# Patient Record
Sex: MALE | Race: WHITE | NOT HISPANIC OR LATINO | Employment: FULL TIME | URBAN - METROPOLITAN AREA
[De-identification: names, ages, dates, MRNs, and addresses within clinical notes are randomized per-mention and may not be internally consistent; named-entity substitution may affect disease eponyms.]

---

## 2025-06-16 ENCOUNTER — TELEPHONE (OUTPATIENT)
Dept: PRIMARY CARE | Facility: CLINIC | Age: 40
End: 2025-06-16
Payer: COMMERCIAL

## 2025-06-16 NOTE — TELEPHONE ENCOUNTER
Patient was last seen in 2021.   He has not been into office for a visit since 2021.   He was wanting to schedule a yearly physical.     Please advise, Thanks!

## 2025-07-25 ASSESSMENT — PROMIS GLOBAL HEALTH SCALE
RATE_PHYSICAL_HEALTH: GOOD
RATE_AVERAGE_PAIN: 2
CARRYOUT_SOCIAL_ACTIVITIES: EXCELLENT
RATE_MENTAL_HEALTH: EXCELLENT
RATE_QUALITY_OF_LIFE: VERY GOOD
EMOTIONAL_PROBLEMS: RARELY
RATE_SOCIAL_SATISFACTION: VERY GOOD
RATE_GENERAL_HEALTH: VERY GOOD
CARRYOUT_PHYSICAL_ACTIVITIES: COMPLETELY

## 2025-07-28 ENCOUNTER — APPOINTMENT (OUTPATIENT)
Dept: PRIMARY CARE | Facility: CLINIC | Age: 40
End: 2025-07-28
Payer: COMMERCIAL

## 2025-07-28 VITALS
BODY MASS INDEX: 29.95 KG/M2 | WEIGHT: 227 LBS | HEART RATE: 83 BPM | DIASTOLIC BLOOD PRESSURE: 82 MMHG | OXYGEN SATURATION: 98 % | SYSTOLIC BLOOD PRESSURE: 124 MMHG

## 2025-07-28 DIAGNOSIS — Z00.00 ENCOUNTER FOR WELL ADULT EXAM WITHOUT ABNORMAL FINDINGS: Primary | ICD-10-CM

## 2025-07-28 DIAGNOSIS — M25.562 LEFT KNEE PAIN, UNSPECIFIED CHRONICITY: ICD-10-CM

## 2025-07-28 DIAGNOSIS — R10.32 LEFT LOWER QUADRANT ABDOMINAL PAIN: ICD-10-CM

## 2025-07-28 PROCEDURE — 99385 PREV VISIT NEW AGE 18-39: CPT | Performed by: FAMILY MEDICINE

## 2025-07-28 PROCEDURE — 1036F TOBACCO NON-USER: CPT | Performed by: FAMILY MEDICINE

## 2025-07-28 SDOH — HEALTH STABILITY: PHYSICAL HEALTH: ON AVERAGE, HOW MANY MINUTES DO YOU ENGAGE IN EXERCISE AT THIS LEVEL?: 30 MIN

## 2025-07-28 SDOH — HEALTH STABILITY: PHYSICAL HEALTH: ON AVERAGE, HOW MANY DAYS PER WEEK DO YOU ENGAGE IN MODERATE TO STRENUOUS EXERCISE (LIKE A BRISK WALK)?: 5 DAYS

## 2025-07-28 ASSESSMENT — PATIENT HEALTH QUESTIONNAIRE - PHQ9
2. FEELING DOWN, DEPRESSED OR HOPELESS: NOT AT ALL
SUM OF ALL RESPONSES TO PHQ9 QUESTIONS 1 & 2: 0
1. LITTLE INTEREST OR PLEASURE IN DOING THINGS: NOT AT ALL

## 2025-07-28 ASSESSMENT — ENCOUNTER SYMPTOMS
ABDOMINAL PAIN: 1
FEVER: 0
COUGH: 0

## 2025-07-28 NOTE — PROGRESS NOTES
Subjective   Patient ID: Beck Lujan is a 40 y.o. male who presents for Annual Exam (PHQ2 - negative ), Abdominal Pain, and Knee Pain.    Abdominal Pain  This is a new problem. The current episode started 1 to 4 weeks ago. The pain is located in the LLQ. The pain is at a severity of 2/10. The quality of the pain is aching. Pertinent negatives include no fever.   Knee Pain   The pain is present in the left knee. The pain is at a severity of 3/10.        Here today for annual physical  He has had left lower quadrant abdominal discomfort over the past few weeks.  This typically occurs when he is hunching forward.  He describes this as a mild discomfort.  This is overall unchanged  He was in [place] 1 month ago and did have 1 week of diarrhea afterwards.  The diarrhea has gotten better.  He is now having a bowel movement every 1 to 2 days.  He has been taking fiber  He has had left knee pain.  This started 1 month ago after he had been sitting on a plane for a few hours.  He typically only noticed this when he is squatting.  This is located anterior aspect of the knee.  No history of any knee problems in the past  He works as a teacher  He does not smoke  Family history is significant for type 1 diabetes in his sister.  No family history of any early heart disease or early cancer  Last Tdap vaccine was 2021    Patient Health Questionnaire-2 Score: 0 (7/28/2025 11:11 AM)        Review of Systems   Constitutional:  Negative for fever.   Respiratory:  Negative for cough.    Cardiovascular:  Negative for chest pain.   Gastrointestinal:  Positive for abdominal pain.   All other systems reviewed and are negative.      Objective   /82   Pulse 83   Wt 103 kg (227 lb)   SpO2 98%   BMI 29.95 kg/m²     Physical Exam  Vitals reviewed.   Constitutional:       General: He is not in acute distress.     Appearance: Normal appearance. He is well-developed.   HENT:      Head: Normocephalic.      Right Ear: Tympanic membrane,  ear canal and external ear normal.      Left Ear: Tympanic membrane, ear canal and external ear normal.      Nose: Nose normal.      Mouth/Throat:      Mouth: Mucous membranes are moist.     Eyes:      Conjunctiva/sclera: Conjunctivae normal.     Neck:      Thyroid: No thyromegaly.      Vascular: No JVD.     Cardiovascular:      Rate and Rhythm: Normal rate and regular rhythm.      Heart sounds: Normal heart sounds.   Pulmonary:      Effort: Pulmonary effort is normal.      Breath sounds: Normal breath sounds.   Abdominal:      Palpations: Abdomen is soft.      Tenderness: There is no abdominal tenderness.     Musculoskeletal:         General: No tenderness. Normal range of motion.      Right lower leg: No edema.      Left lower leg: No edema.      Comments: Left knee exam: There is no tenderness to palpation.  No swelling.  No pain with patellar movement   Lymphadenopathy:      Cervical: No cervical adenopathy.     Skin:     Findings: No rash.     Neurological:      Mental Status: He is alert and oriented to person, place, and time.     Psychiatric:         Mood and Affect: Mood normal.         Behavior: Behavior normal.         Assessment/Plan   Assessment & Plan  Encounter for well adult exam without abnormal findings    Orders:    CBC; Future    Comprehensive Metabolic Panel; Future    Lipid Panel; Future    Left knee pain, unspecified chronicity    Orders:    CBC; Future    Comprehensive Metabolic Panel; Future    Lipid Panel; Future    Referral to Physical Therapy; Future    XR knee left 4+ views; Future    Left lower quadrant abdominal pain    Orders:    CBC; Future    Comprehensive Metabolic Panel; Future    Lipid Panel; Future    US abdomen complete; Future    Healthy diet and regular exercise recommended  Check screening labs including glucose and lipid panel  Up-to-date on tetanus vaccine  Abdominal discomfort: He has had a several week history of a mild discomfort involving the left lower quadrant of his  abdomen, which is typically only noticeable when he is hunching forward.  He did have diarrhea at onset which has since resolved.  This started close to the same time that he had started taking fiber, and fiber may be contributing to his abdominal discomfort.  I did recommend that he hold the fiber for now to see if symptoms improve  If the symptoms do not improve, recommended abdominal ultrasound to further evaluate.  I will order this today, and it would be okay for him to cancel if the pain resolves completely within a week of holding fiber  Recommended that he contact me within 3 to 4 weeks if abdominal pain has not completely resolved  Left knee pain: We will order an x-ray and refer for PT.  Follow-up in the next 2 months if pain does not resolve with PT  Follow-up in 1 year for next CPE

## 2025-07-28 NOTE — PROGRESS NOTES
Physical Therapy  Physical Therapy Evaluation    Patient Name: Beck Lujan  MRN: [id number]  Today's Date: 7/28/2025                            Insurance:    Visit number: 1 of 9  Authorization info: No Auth Req  Insurance Type: [employer]    General:  Reason for visit: ***  Referred by: Edmundo Walsh DO     Current Problem:  M25.562ICD-10-CMLeft knee pain, unspecified chronicity      Precautions: ***         Medical History Form: Reviewed (scanned into chart)    Subjective:   He has had left knee pain.  This started 1 month ago after he had been sitting on a plane for a few hours.  He typically only noticed this when he is squatting.  This is located anterior aspect of the knee.  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    Chief Complaint: Patient presents to clinic ***  Onset Date: ***  NOEL: {NOEL:72530}    Current Condition:   {Current Condition:39314}    Pain:     Location: ***  Description: ***  Aggravating Factors:  {Aggravating Factors:62529}  Relieving Factors:  {Relieving Factors:91024}    Relevant Information (PMH & Previous Tests/Imaging): NA  Previous Interventions/Treatments: {Previous Interventions/Treatments:68501}    Prior Level of Function (PLOF)  Patient previously independent with all ADLs  Exercise/Physical Activity: ***  Work/School: ***    Patients Living Environment: Reviewed and no concern    Primary Language: ***     Patient's Goal(s) for Therapy: ***    Red Flags: Do you have any of the following? {Yes/No:14788}  Fever/chills, unexplained weight changes, dizziness/fainting, unexplained change in bowel or bladder functions, unexplained malaise or muscle weakness, night pain/sweats, numbness or tingling    Objective:    Knee ROM (degrees) -  L knee Extension: *** AROM   L knee Flexion: *** AROM       Knee MMT (/5) -   L knee Extension: ***   L knee Flexion: ***     Hip MMT (/5) -   L hip Flexion: ***  L hip Adduction: ***  L hip Abduction:  ***    Integumentary -   ***    Palpation -  TTP ***  Tightness Noted of ***    Special Tests -  Lachman {POSITIVE/NEGATIVE:19171} {RIGHT/LEFT:83675}  Anterior Drawer {POSITIVE/NEGATIVE:23171} {RIGHT/LEFT:24811}  Posterior Drawer {POSITIVE/NEGATIVE:87252} {RIGHT/LEFT:25366}  Valgus/Varus {POSITIVE/NEGATIVE:61667} {RIGHT/LEFT:60963}      Gait -  Pt is ambulating {WITH/WITHOUT:39576} an assistive device (***). Pattern: *** 3-point step *** pattern.   Noted: *** antalgic, decreased heel strike, decreased toe push off, Trendelenburg, and trunk lean.   *** ataxia, circumduction, foot slap, hip hike, vaulting, and toe drag.    Posture -   Lower Extremity Functional Movements  Transfers: ***  Gait: ***      Outcome Measures:  {PT Outcome Measures:66663}     Complexity ***    EDUCATION: Pt educated in HEP, PT POC, anatomy, activity avoidance, proper positioning/posture, use of *** , pt demonstrates understanding of PT info, all questions answered.    HEP:    Goals: Set and discussed today  Increase ROM to WFL of L knee  Increase Strength where deficits noted by 1/3 to 1 mm grade of L knee  Ind w/ HEP to expedite progress and promote goal achievement of L knee  Improve Outcome score to  evidence improved function of L knee  Return to PLOF of L knee  Decrease pain to 2/10 or less of L knee  Pt's functional goals      Plan of care was developed with input and agreement by the patient.    Treatment Performed:    Therapeutic Exercise:    *** min  ***    Manual Therapy:    *** min  ***      Assessment: 39 y/o M presents with c/o L knee pain. Upon examination patient demonstrates *** limiting overall functional mobility including ***. Activity limitations and participations restrictions include ***. Pt to benefit from outpatient PT to address deficits, maximize functional mobility and improve QOL.  The clinical presentation of this patient is stable and their history and examination findings are consistent with a low complexity  evaluation with good rehab potential.            Plan:     Planned Interventions include: therapeutic exercise, self-care home management, manual therapy, therapeutic activities, gait training, neuromuscular coordination, vasopneumatic, dry needling, aquatic therapy and modalities PRN.  Frequency: 2x/wk  Duration: 4weeks

## 2025-07-29 LAB
ALBUMIN SERPL-MCNC: 4.8 G/DL (ref 3.6–5.1)
ALP SERPL-CCNC: 69 U/L (ref 36–130)
ALT SERPL-CCNC: 19 U/L (ref 9–46)
ANION GAP SERPL CALCULATED.4IONS-SCNC: 6 MMOL/L (CALC) (ref 7–17)
AST SERPL-CCNC: 21 U/L (ref 10–40)
BILIRUB SERPL-MCNC: 1.1 MG/DL (ref 0.2–1.2)
BUN SERPL-MCNC: 13 MG/DL (ref 7–25)
CALCIUM SERPL-MCNC: 9.7 MG/DL (ref 8.6–10.3)
CHLORIDE SERPL-SCNC: 107 MMOL/L (ref 98–110)
CHOLEST SERPL-MCNC: 162 MG/DL
CHOLEST/HDLC SERPL: 4.2 (CALC)
CO2 SERPL-SCNC: 27 MMOL/L (ref 20–32)
CREAT SERPL-MCNC: 1.04 MG/DL (ref 0.6–1.26)
EGFRCR SERPLBLD CKD-EPI 2021: 94 ML/MIN/1.73M2
ERYTHROCYTE [DISTWIDTH] IN BLOOD BY AUTOMATED COUNT: 12.5 % (ref 11–15)
GLUCOSE SERPL-MCNC: 88 MG/DL (ref 65–99)
HCT VFR BLD AUTO: 46.2 % (ref 38.5–50)
HDLC SERPL-MCNC: 39 MG/DL
HGB BLD-MCNC: 15.7 G/DL (ref 13.2–17.1)
LDLC SERPL CALC-MCNC: 105 MG/DL (CALC)
MCH RBC QN AUTO: 31.8 PG (ref 27–33)
MCHC RBC AUTO-ENTMCNC: 34 G/DL (ref 32–36)
MCV RBC AUTO: 93.5 FL (ref 80–100)
NONHDLC SERPL-MCNC: 123 MG/DL (CALC)
PLATELET # BLD AUTO: 225 THOUSAND/UL (ref 140–400)
PMV BLD REES-ECKER: 10.5 FL (ref 7.5–12.5)
POTASSIUM SERPL-SCNC: 4.6 MMOL/L (ref 3.5–5.3)
PROT SERPL-MCNC: 7.1 G/DL (ref 6.1–8.1)
RBC # BLD AUTO: 4.94 MILLION/UL (ref 4.2–5.8)
SODIUM SERPL-SCNC: 140 MMOL/L (ref 135–146)
TRIGL SERPL-MCNC: 90 MG/DL
WBC # BLD AUTO: 5.1 THOUSAND/UL (ref 3.8–10.8)

## 2025-07-30 ENCOUNTER — EVALUATION (OUTPATIENT)
Dept: PHYSICAL THERAPY | Facility: CLINIC | Age: 40
End: 2025-07-30
Payer: COMMERCIAL

## 2025-07-30 DIAGNOSIS — M25.562 LEFT ANTERIOR KNEE PAIN: Primary | ICD-10-CM

## 2025-07-30 DIAGNOSIS — M25.562 ACUTE PAIN OF LEFT KNEE: ICD-10-CM

## 2025-07-30 PROCEDURE — 97161 PT EVAL LOW COMPLEX 20 MIN: CPT | Mod: GP

## 2025-07-30 PROCEDURE — 97110 THERAPEUTIC EXERCISES: CPT | Mod: GP

## 2025-07-30 ASSESSMENT — PATIENT HEALTH QUESTIONNAIRE - PHQ9
1. LITTLE INTEREST OR PLEASURE IN DOING THINGS: NOT AT ALL
SUM OF ALL RESPONSES TO PHQ9 QUESTIONS 1 AND 2: 0
2. FEELING DOWN, DEPRESSED OR HOPELESS: NOT AT ALL

## 2025-07-30 ASSESSMENT — ENCOUNTER SYMPTOMS
DEPRESSION: 0
LOSS OF SENSATION IN FEET: 0
OCCASIONAL FEELINGS OF UNSTEADINESS: 0

## 2025-07-30 NOTE — PROGRESS NOTES
Patient Name: Beck Lujan  MRN: [id number]  Time Calculation  Start Time: 1555  Stop Time: 1640  Time Calculation (min): 45 min  PT Evaluation Time Entry  PT Evaluation (Low) Time Entry: 30  PT Therapeutic Procedures Time Entry  Therapeutic Exercise Time Entry: 10     Non-Billable Time  Non-billable time: 5  Non-billable time reason: Dry needling    Current Problem  1. Left anterior knee pain  Follow Up In Physical Therapy      2. Acute pain of left knee  Follow Up In Physical Therapy        Insurance  Visit #1  ANTHEM BMN PT OT COPAY 0 (0)   COVERAGE 80 OOP 9450(0) NO AUTH REQ         Subjective     General: Pt is a 40 y.o. male presenting to clinic with c/o L anterior knee pain and pressure present for about one month. Notes when  in crouched position he states noticing deep ache in the front of the knee since he traveling in plane for 4 hours and states he was really cramped up. Denies any numbness or tingling. Denies any buckling, catching. States occasional pop when extending leg after sitting bent for a while. 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 He states previous LisFranc injury on R foot (10 years ago) and notices has been compensating recently more than normal especially since recent trip to [place] about 1 month ago. Currently having difficulty with deep bending, kneeling, squatting. 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        Pain:  Current:  0/10  High: 5/10  Low: 0/10  Av/10     Aggravating Factors: Crouching, deep bending, squatting  Alleviating Factors: Rest, extending leg    Reviewed medical screening form with pt and medical screening assessed      Objective   Knee Musculoskeletal Exam  Gait    Gait is normal.    Inspection    Right      Right knee inspection is normal.       Left      Left knee inspection is normal.     Palpation    Right      Right knee palpation is unremarkable.        Tenderness: none      Left      Tenderness: present          Patellar tendon: mild          Quadriceps tendon: mild      Range of Motion    Right      Right knee range of motion is normal and full.        Active extension: -5      Active flexion: 130    Left      Left knee range of motion is normal and full.        Active extension: -5      Active flexion: 130    Strength    Strength additional comments: See below     Instability    Right      Instability signs: none - stable    Left      Instability signs: none - stable    Neurovascular    Right      Right knee neurovascular exam is normal.      Left      Left knee neurovascular exam is normal.      Special Signs    Left      J sign: mild        Patellar apprehension: mild      Hip Musculoskeletal Exam  Gait    Gait is normal.    Inspection    Right        Right hip inspection is normal.    Left        Left hip inspection is normal.    Palpation    Right        Right hip palpation is normal.      Tenderness: none    Left        Left hip palpation is normal.      Tenderness: none    Range of Motion    Right      Right hip range of motion is within functional limits.     Left      Left hip range of motion is within functional limits.     Strength    Strength additional comments: See below    Neurovascular    Right        Right hip neurovascular exam is normal.    Left        Left hip neurovascular exam is normal.    Special Tests    Right      JOHN test (right): negative      Impingement test: negative    Left      JOHN test (left): negative      Impingement test: negative       (+) = pain indicated during testing    R/L Hip Flex: 36#/37#  R/L Hip Ext: 46#/49#  R/L Hip Abd: 35/34#  R/L Knee Ext: 57#/60# (+)  R/L Knee Flex: 65#/60#    FADDIR R/L: -/-  JOHN R/L: -/-    SL squat: Increase knee valgus and discomfort on L compared to R LE  SL 3 hop  test: WNL tennille  Step-down: Increase knee valgus and discomfort on L compared to R LE    Outcome Measures:  LEFS:  68/80     Treatment: See HEP below  Dynamic HS stretch x12 5 sec  [place] squat with black TB x10 15 sec hold  3-way hip vs GTB x10 ea way  Heel elevated squat with GTB around knees x12 5 sec hold    Intramuscular Dry Needling:  Intramuscular dry needling  Patient does not present with any of the following precautions/contraindications for dry needling treatment: active infection, presence of open wound, personal/family history of blood clotting disorder, active malignancy, extreme aversion to needles or previous/recent surgery in the target area. Patient provided informed consent for treatment. Patient tolerated treatment well and did not report adverse reactions. No adverse reactions were observed.      R knee Needle Placement:  Needle 1 75 mm needle, Location:Medial L quadriceps, Depth: 75-90%, Insertion angle: Perpendicular, Needle manipulation performed, pistoning  Needle 2 75 mm needle, Location: Lateral L quadriceps, Depth: 75-90%, Insertion angle: Perpendicular, Needle manipulation performed, pistoning  Needle 3 75 mm needle, Location: L genu articularis, Depth: 75-90%, Insertion angle: Perpendicular, Needle manipulation performed, pistoning  Needle 4- 30 mm needle, Location: Medial L patellar tendon, Depth: 50-75%, Insertion angle: Oblique, Needle manipulation performed, pistoning  Needle 5- 30 mm needle, Location: Lateral L patellar tendon, Depth: 50-75%, Insertion angle: Oblique, Needle manipulation performed, pistoning    Area prepped and cleaned with isopropyl alcohol pad in sterile fashion. Therapist wore appropriate PPE. Removed without adverse reaction. Patient educated regarding potential post-needling soreness and self-management strategies           EDUCATION/HEP:  Pt was educated on the anatomy and patellar tracking of knee along with building patellar tendon loading capacity. Pt was  educated on patellar tendinopathy and bursitis related signs along with importance in patellar and knee stability via quad and lateral gluteal strengthening. Pt was educated on benefit of dry needling in addition to exercises and provided informed consent to treatment this date. Pt was educated on common adverse effects and symptoms post dry needling. He was encouraged to drink water and hydrate as well as take a warm shower to minimize normal symptom of muscular soreness. He was instructed that muscular ache and soreness is a normal symptom after dry needling and was encouraged to perform all above activities to minimize symptoms. Pt denied any sharp/shooting or adverse effects during and after treatment of dry needling. Pt was educated on POC, expected timeline and interventions. Pt acknowledged good understanding and was in agreement to all above information.        Assessment:   Pt is a 40 y.o. male coming to clinic with primary complaint of L anterior knee pain and pressure . On exam is demonstrating  s/s  with patellar tendinitis, bursitis and decreased tolerance to patellar tendon loading. He demonstrates decreased tolerance to deep squatting and bending . These deficits have lead to functional impairments with deep squatting and crouching and occasionally descending stairs . Recommend skilled PT to address the aforementioned deficits and allow pt to restore PLOF and maximize functional capacity. Anticipate good prognosis given age, CLOF, activity level, acute nature of condition , attitude towards therapy, and support system. Patient would benefit from PT services to address current impairments and facilitate improvement in current activity limits. Educated patient on current POC, initial HEP and current examination findings. Patient verbalized understanding of all education and instruction provided today.    Patient tolerated treatment well today and demonstrated/noted less pressure in anterior  knee with squatting after DN performed this date. He demonstrated no adverse effects during or after treatment. Will continue to benefit from skilled intervention to address the above mentioned impairments and limitations. No adverse reactions were observed or reported from patient at the conclusion of today's session.       Clinical Presentation: Stable    Level of Complexity: Low     Goals:    Patient will improve L knee strength >/= 90% of limb symmetry index R LE strength testing with hand dynamometer along with pain-free during testing.    Patient will demonstrate full-range pain free bodyweight squat 10 times to demonstrate improved tolerance to deep knee bending/squatting.    Patient will improve SL step down and squat to WNL of R LE as demonstrated with no pain or significant knee valgus compensation to indicated improved knee stability.    Patient will be independent with HEP.    Pt will improve LEFS score by at least 9 points (MCID) to indicate significant improvement in functional abilities.       Plan  1x/week for 2 visits     Planned interventions include: blood flow restriction, aquatic therapy, biofeedback, cryotherapy, dry needling, edema control, education/instruction, electrical stimulation, gait training, home program, hot pack, kinesiotaping, manual therapy, neuromuscular re-education, self care/home management, therapeutic activities, therapeutic exercises, ultrasound and vasopneumatic device w/ cold.

## 2025-08-06 ENCOUNTER — TREATMENT (OUTPATIENT)
Dept: PHYSICAL THERAPY | Facility: CLINIC | Age: 40
End: 2025-08-06
Payer: COMMERCIAL

## 2025-08-06 DIAGNOSIS — M25.562 ACUTE PAIN OF LEFT KNEE: ICD-10-CM

## 2025-08-06 DIAGNOSIS — M25.562 LEFT ANTERIOR KNEE PAIN: ICD-10-CM

## 2025-08-06 PROCEDURE — 97032 APPL MODALITY 1+ESTIM EA 15: CPT | Mod: GP

## 2025-08-06 PROCEDURE — 97110 THERAPEUTIC EXERCISES: CPT | Mod: GP

## 2025-08-06 NOTE — PROGRESS NOTES
Patient Name: Beck Lujan  MRN: [id number]  Time Calculation  Start Time: 1642  Stop Time: 1727  Time Calculation (min): 45 min     PT Therapeutic Procedures Time Entry  Therapeutic Exercise Time Entry: 35  PT Modalities Time Entry  E-Stim (Attended) Time Entry: 10       Current Problem  1. Left anterior knee pain  Follow Up In Physical Therapy      2. Acute pain of left knee  Follow Up In Physical Therapy        Insurance  Visit #2  ANTHEM BMN PT OT COPAY 0 (0)   COVERAGE 80 OOP 9450(0) NO AUTH REQ         Subjective     General:   Pt notes doing well since last visit but states not progressing depth yet. He states German squats seem to help him get increased depth. He notes heel elevated squats are still the most difficult. He states still somewhat difficult achieving lower depth but slightly improved since last visit. Notes no issues with trialed walk/jog since last session. States some increase tightness s/t to trip to [place] but no increase in pain only difficulty/mild discomfort with low depth in squat. No other new concerns mentioned.      Pain:  Current:  0/10        Objective     Treatment:   Incline treadmill walking x 5 min @ 2.2 mph increasing 1 deg incline every minute    Glute bridge with abduction vs GTB 2x12  Sumo squat vs GTB x12   Monster walk/side stepping vs GTB x 5 laps ea  8 in power step-up F/L 2x10 ea way holding 5# weight  6 in F/L heel tap 2x10 ea   8 in F/L heel tap x10 ea (added to HEP)  Iso abd vs GTB plus reverse lunge on slide 2x10 (added to HEP)  Northern Irish squat with black TB x10 15 sec hold      Intramuscular Dry Needling:  Intramuscular dry needling  Patient does not present with any of the following precautions/contraindications for dry needling treatment: active infection, presence of open wound, personal/family history of blood clotting disorder, active malignancy, extreme aversion to needles or previous/recent surgery in the target area. Patient provided informed  consent for treatment. Patient tolerated treatment well and did not report adverse reactions. No adverse reactions were observed.      L knee Needle Placement:  Needle 1 75 mm needle, Location:Medial L quadriceps, Depth: 75-90%, Insertion angle: Perpendicular, Needle manipulation performed, pistoning  Needle 2 75 mm needle, Location: Lateral L quadriceps, Depth: 75-90%, Insertion angle: Perpendicular, Needle manipulation performed, pistoning  Needle 3 75 mm needle, Location: L genu articularis, Depth: 75-90%, Insertion angle: Perpendicular, Needle manipulation performed, pistoning  Needle 4- 30 mm needle, Location: Medial L patellar tendon, Depth: 50-75%, Insertion angle: Oblique, Needle manipulation performed, pistoning  Needle 5- 30 mm needle, Location: Lateral L patellar tendon, Depth: 50-75%, Insertion angle: Oblique, Needle manipulation performed, pistoning    Area prepped and cleaned with isopropyl alcohol pad in sterile fashion. Therapist wore appropriate PPE. Removed without adverse reaction. Patient educated regarding potential post-needling soreness and self-management strategies       Intramuscular dry needling  Patient does not present with any of the following precautions/contraindications for dry needling treatment: active infection, presence of open wound, personal/family history of blood clotting disorder, active malignancy, extreme aversion to needles or previous/recent surgery in the target area. Patient provided informed consent for treatment. Patient tolerated treatment well and did not report adverse reactions. No adverse reactions were observed.      Electrical Stimulation applied to Needles 2 and 7 as well as 1 and 6 to promote improved pain relief and soft tissue mobility along with further reduction in muscle spasm, improvement in blood flow, decreasing hypersensitivity and pain for 10 min.       EDUCATION/HEP:  Pt encouraged to continue with Welsh squats and focus on using that exercise  initially to prime patellar tendon and knee. He was encouraged to progress squat depth as tolerated. He was encouraged to trial some increased intervals light jogging as able. Pt acknowledged good understanding and was in agreement to all above information.      Assessment:   Pt tolerated session well and demonstrated no adverse effects s/t to dry needling and stimulation performed this date. He tolerated all progressions in all dynamic knee strengthening and patellar tendon loading with no inc in pain. He demonstrated improved depth with the addition of Bulgarian squats prior to other exercises. Pt was educated on common adverse effects and symptoms post dry needling. He was encouraged to drink water and hydrate as well as take a warm shower to minimize normal symptom of muscular soreness. He was instructed that muscular ache and soreness is a normal symptom after dry needling and was encouraged to perform all above activities to minimize symptoms. Pt denied any sharp/shooting or adverse effects during and after treatment of dry needling.      Goals:    Patient will improve L knee strength >/= 90% of limb symmetry index R LE strength testing with hand dynamometer along with pain-free during testing.    Patient will demonstrate full-range pain free bodyweight squat 10 times to demonstrate improved tolerance to deep knee bending/squatting.    Patient will improve SL step down and squat to WNL of R LE as demonstrated with no pain or significant knee valgus compensation to indicated improved knee stability.    Patient will be independent with HEP.    Pt will improve LEFS score by at least 9 points (MCID) to indicate significant improvement in functional abilities.       Plan  1x/week for 2 visits     Planned interventions include: blood flow restriction, aquatic therapy, biofeedback, cryotherapy, dry needling, edema control, education/instruction, electrical stimulation, gait training, home program, hot pack,  kinesiotaping, manual therapy, neuromuscular re-education, self care/home management, therapeutic activities, therapeutic exercises, ultrasound and vasopneumatic device w/ cold.

## 2025-08-08 ENCOUNTER — HOSPITAL ENCOUNTER (OUTPATIENT)
Dept: RADIOLOGY | Facility: CLINIC | Age: 40
Discharge: HOME | End: 2025-08-08
Payer: COMMERCIAL

## 2025-08-08 ENCOUNTER — TELEPHONE (OUTPATIENT)
Dept: PRIMARY CARE | Facility: CLINIC | Age: 40
End: 2025-08-08
Payer: COMMERCIAL

## 2025-08-08 DIAGNOSIS — M25.562 LEFT KNEE PAIN, UNSPECIFIED CHRONICITY: ICD-10-CM

## 2025-08-08 DIAGNOSIS — R10.32 LEFT LOWER QUADRANT ABDOMINAL PAIN: ICD-10-CM

## 2025-08-08 DIAGNOSIS — R10.32 LEFT LOWER QUADRANT ABDOMINAL PAIN: Primary | ICD-10-CM

## 2025-08-08 PROCEDURE — 73562 X-RAY EXAM OF KNEE 3: CPT | Mod: LT

## 2025-08-08 PROCEDURE — 73562 X-RAY EXAM OF KNEE 3: CPT | Mod: LEFT SIDE | Performed by: RADIOLOGY

## 2025-08-08 NOTE — TELEPHONE ENCOUNTER
He was seen on 7/28 with a several week history of mild discomfort involving the left lower quadrant of his abdomen.  An ultrasound of the abdomen was ordered at that time  I received a message from radiology department that the area of his discomfort is lower down than the region where the ultrasound will visualize.  They recommended possibly obtaining a CT scan.  I called him and left message to call back    Addendum: I spoke with him.  He is still having recurrent left lower quadrant abdominal pain since the last visit.  No change in bowel habits or nausea/vomiting.  We will proceed with a CT scan to further evaluate

## 2025-08-12 ENCOUNTER — HOSPITAL ENCOUNTER (OUTPATIENT)
Dept: RADIOLOGY | Facility: HOSPITAL | Age: 40
Discharge: HOME | End: 2025-08-12
Payer: COMMERCIAL

## 2025-08-12 DIAGNOSIS — R10.32 LEFT LOWER QUADRANT ABDOMINAL PAIN: ICD-10-CM

## 2025-08-12 PROCEDURE — 74177 CT ABD & PELVIS W/CONTRAST: CPT

## 2025-08-12 PROCEDURE — 2550000001 HC RX 255 CONTRASTS: Performed by: FAMILY MEDICINE

## 2025-08-12 PROCEDURE — 74177 CT ABD & PELVIS W/CONTRAST: CPT | Performed by: STUDENT IN AN ORGANIZED HEALTH CARE EDUCATION/TRAINING PROGRAM

## 2025-08-12 RX ADMIN — IOHEXOL 75 ML: 350 INJECTION, SOLUTION INTRAVENOUS at 16:46

## 2025-08-13 ENCOUNTER — APPOINTMENT (OUTPATIENT)
Dept: PHYSICAL THERAPY | Facility: CLINIC | Age: 40
End: 2025-08-13
Payer: COMMERCIAL

## 2025-08-13 ENCOUNTER — TELEPHONE (OUTPATIENT)
Dept: PRIMARY CARE | Facility: CLINIC | Age: 40
End: 2025-08-13
Payer: COMMERCIAL

## 2025-08-13 ENCOUNTER — OFFICE VISIT (OUTPATIENT)
Dept: GASTROENTEROLOGY | Facility: CLINIC | Age: 40
End: 2025-08-13
Payer: COMMERCIAL

## 2025-08-13 VITALS
OXYGEN SATURATION: 98 % | WEIGHT: 229 LBS | DIASTOLIC BLOOD PRESSURE: 86 MMHG | HEIGHT: 73 IN | SYSTOLIC BLOOD PRESSURE: 136 MMHG | BODY MASS INDEX: 30.35 KG/M2 | HEART RATE: 84 BPM | TEMPERATURE: 98.7 F

## 2025-08-13 DIAGNOSIS — K56.41 FECAL IMPACTION (MULTI): ICD-10-CM

## 2025-08-13 DIAGNOSIS — K59.00 CONSTIPATION, UNSPECIFIED CONSTIPATION TYPE: Primary | ICD-10-CM

## 2025-08-13 DIAGNOSIS — K52.9 COLITIS: Primary | ICD-10-CM

## 2025-08-13 DIAGNOSIS — K62.89 PROCTITIS: ICD-10-CM

## 2025-08-13 PROCEDURE — 99212 OFFICE O/P EST SF 10 MIN: CPT | Performed by: INTERNAL MEDICINE

## 2025-08-13 PROCEDURE — 3008F BODY MASS INDEX DOCD: CPT | Performed by: INTERNAL MEDICINE

## 2025-08-13 PROCEDURE — 99204 OFFICE O/P NEW MOD 45 MIN: CPT | Performed by: INTERNAL MEDICINE

## 2025-08-13 RX ORDER — POLYETHYLENE GLYCOL 3350 17 G/17G
238 POWDER, FOR SOLUTION ORAL ONCE
Qty: 238 G | Refills: 0 | Status: SHIPPED | OUTPATIENT
Start: 2025-08-13 | End: 2025-08-13

## 2025-08-13 ASSESSMENT — PAIN SCALES - GENERAL: PAINLEVEL_OUTOF10: 2

## 2025-08-17 ASSESSMENT — ENCOUNTER SYMPTOMS
LIGHT-HEADEDNESS: 0
TROUBLE SWALLOWING: 0
CHILLS: 0
UNEXPECTED WEIGHT CHANGE: 0
EYE REDNESS: 0
CONFUSION: 0
ROS GI COMMENTS: SEE HPI
SHORTNESS OF BREATH: 0
COLOR CHANGE: 0
COUGH: 0
FEVER: 0
BRUISES/BLEEDS EASILY: 0
WEAKNESS: 0

## 2025-08-21 LAB — CALPROTECTIN STL-MCNT: 22 MCG/G

## 2025-08-28 ENCOUNTER — APPOINTMENT (OUTPATIENT)
Dept: PHYSICAL THERAPY | Facility: CLINIC | Age: 40
End: 2025-08-28
Payer: COMMERCIAL

## 2025-08-28 DIAGNOSIS — M25.562 ACUTE PAIN OF LEFT KNEE: ICD-10-CM

## 2025-08-28 DIAGNOSIS — M25.562 LEFT ANTERIOR KNEE PAIN: ICD-10-CM

## 2025-08-28 PROCEDURE — 97110 THERAPEUTIC EXERCISES: CPT | Mod: GP

## 2025-09-02 ENCOUNTER — TELEPHONE (OUTPATIENT)
Dept: PRIMARY CARE | Facility: CLINIC | Age: 40
End: 2025-09-02
Payer: COMMERCIAL

## 2025-09-02 DIAGNOSIS — M25.562 LEFT KNEE PAIN, UNSPECIFIED CHRONICITY: Primary | ICD-10-CM

## 2025-09-20 ENCOUNTER — APPOINTMENT (OUTPATIENT)
Dept: RADIOLOGY | Facility: HOSPITAL | Age: 40
End: 2025-09-20
Payer: COMMERCIAL

## 2025-12-17 ENCOUNTER — APPOINTMENT (OUTPATIENT)
Dept: GASTROENTEROLOGY | Facility: CLINIC | Age: 40
End: 2025-12-17
Payer: COMMERCIAL

## 2026-07-20 ENCOUNTER — APPOINTMENT (OUTPATIENT)
Dept: PRIMARY CARE | Facility: CLINIC | Age: 41
End: 2026-07-20
Payer: COMMERCIAL